# Patient Record
Sex: FEMALE | Race: WHITE
[De-identification: names, ages, dates, MRNs, and addresses within clinical notes are randomized per-mention and may not be internally consistent; named-entity substitution may affect disease eponyms.]

---

## 2017-05-07 ENCOUNTER — HOSPITAL ENCOUNTER (EMERGENCY)
Dept: HOSPITAL 76 - ED | Age: 82
Discharge: HOME | End: 2017-05-07
Payer: MEDICARE

## 2017-05-07 ENCOUNTER — HOSPITAL ENCOUNTER (OUTPATIENT)
Dept: HOSPITAL 76 - EMS | Age: 82
Discharge: ANOTHER HOSPITAL | End: 2017-05-07
Attending: SURGERY
Payer: MEDICARE

## 2017-05-07 VITALS — SYSTOLIC BLOOD PRESSURE: 166 MMHG | DIASTOLIC BLOOD PRESSURE: 82 MMHG

## 2017-05-07 DIAGNOSIS — Z66: ICD-10-CM

## 2017-05-07 DIAGNOSIS — R07.9: Primary | ICD-10-CM

## 2017-05-07 DIAGNOSIS — Z85.3: ICD-10-CM

## 2017-05-07 DIAGNOSIS — I10: ICD-10-CM

## 2017-05-07 DIAGNOSIS — R07.89: Primary | ICD-10-CM

## 2017-05-07 LAB
ALBUMIN/GLOB SERPL: 1.5 {RATIO} (ref 1–2.2)
ANION GAP SERPL CALCULATED.4IONS-SCNC: 10 MMOL/L (ref 6–13)
BASOPHILS NFR BLD AUTO: 0 10^3/UL (ref 0–0.1)
BASOPHILS NFR BLD AUTO: 0.4 %
BILIRUB BLD-MCNC: 0.6 MG/DL (ref 0.2–1)
BUN SERPL-MCNC: 19 MG/DL (ref 6–20)
CALCIUM UR-MCNC: 8.9 MG/DL (ref 8.5–10.3)
CHLORIDE SERPL-SCNC: 102 MMOL/L (ref 101–111)
CO2 SERPL-SCNC: 25 MMOL/L (ref 21–32)
CREAT SERPLBLD-SCNC: 0.8 MG/DL (ref 0.4–1)
EOSINOPHIL # BLD AUTO: 0.2 10^3/UL (ref 0–0.7)
EOSINOPHIL NFR BLD AUTO: 2.1 %
ERYTHROCYTE [DISTWIDTH] IN BLOOD BY AUTOMATED COUNT: 14.7 % (ref 12–15)
GFRSERPLBLD MDRD-ARVRAT: 69 ML/MIN/{1.73_M2} (ref 89–?)
GLOBULIN SER-MCNC: 2.6 G/DL (ref 2.1–4.2)
GLUCOSE SERPL-MCNC: 99 MG/DL (ref 70–100)
HCT VFR BLD AUTO: 37 % (ref 37–47)
HGB UR QL STRIP: 12.3 G/DL (ref 12–16)
LIPASE SERPL-CCNC: 23 U/L (ref 22–51)
LYMPHOCYTES # SPEC AUTO: 1.6 10^3/UL (ref 1.5–3.5)
LYMPHOCYTES NFR BLD AUTO: 17.7 %
MCH RBC QN AUTO: 31.1 PG (ref 27–31)
MCHC RBC AUTO-ENTMCNC: 33.3 G/DL (ref 32–36)
MCV RBC AUTO: 93.2 FL (ref 81–99)
MONOCYTES # BLD AUTO: 1 10^3/UL (ref 0–1)
MONOCYTES NFR BLD AUTO: 10.7 %
NEUTROPHILS # BLD AUTO: 6.4 10^3/UL (ref 1.5–6.6)
NEUTROPHILS # SNV AUTO: 9.3 X10^3/UL (ref 4.8–10.8)
NEUTROPHILS NFR BLD AUTO: 69.1 %
NRBC # BLD AUTO: 0.1 /100WBC
PDW BLD AUTO: 10.9 FL (ref 7.9–10.8)
POTASSIUM SERPL-SCNC: 3.6 MMOL/L (ref 3.5–5)
PROT SPEC-MCNC: 6.4 G/DL (ref 6.7–8.2)
RBC MAR: 3.97 10^6/UL (ref 4.2–5.4)
SODIUM SERPLBLD-SCNC: 137 MMOL/L (ref 135–145)
WBC # BLD: 9.3 X10^3/UL

## 2017-05-07 PROCEDURE — 84484 ASSAY OF TROPONIN QUANT: CPT

## 2017-05-07 PROCEDURE — 99284 EMERGENCY DEPT VISIT MOD MDM: CPT

## 2017-05-07 PROCEDURE — 71020: CPT

## 2017-05-07 PROCEDURE — 93010 ELECTROCARDIOGRAM REPORT: CPT

## 2017-05-07 PROCEDURE — 36415 COLL VENOUS BLD VENIPUNCTURE: CPT

## 2017-05-07 PROCEDURE — 93005 ELECTROCARDIOGRAM TRACING: CPT

## 2017-05-07 PROCEDURE — 83690 ASSAY OF LIPASE: CPT

## 2017-05-07 PROCEDURE — 80053 COMPREHEN METABOLIC PANEL: CPT

## 2017-05-07 PROCEDURE — 99283 EMERGENCY DEPT VISIT LOW MDM: CPT

## 2017-05-07 PROCEDURE — 85025 COMPLETE CBC W/AUTO DIFF WBC: CPT

## 2017-05-07 NOTE — ED PHYSICIAN DOCUMENTATION
PD HPI CHEST PAIN





- Stated complaint


Stated Complaint: CP





- Chief complaint


Chief Complaint: Cardiac





- History obtained from


History obtained from: Patient





- History of Present Illness


Timing - onset: Enter  time (22:45)


Timing - onset during: Rest


Timing - duration: Minutes (15-20)


Timing - details: Abrupt onset, Now resolved, Constant


Pain level max: 5


Pain level now: 0


Quality: Pain


Location: Substernal


Radiation: Other (no radiation)


Improved by: Nothing


Worsened by: Other (no exacerbating factors)


Associated symptoms: No: Shortness of air, Diaphoresis, Nausea, Vomiting, 

Feeling faint / dizzy, General Weakness, Palpitations, Cough


Similar symptoms before: Has not had sx before


Recently seen: Not recently seen





- Additional information


Additional information: 





patient complains of sudden onset of chest pain 10:45 PM tonight while reading 

in bed. pain has resolved, lasted approximately 15 to 20 minutes. No 

exacerbating or ameliorating factors. Patient denies history of similar 

symptoms.





Review of Systems


Constitutional: reports: Reviewed and negative


Cardiac: reports: Chest pain / pressure.  denies: Palpitations, Pedal edema, 

Calf pain


Respiratory: denies: Dyspnea


GI: reports: Reviewed and negative





PD PAST MEDICAL HISTORY





- Past Medical History


Cardiovascular: None


Respiratory: None


Neuro: None


Endocrine/Autoimmune: None


GI: None


GYN: Breast cancer


: None


HEENT: None


Psych: None


Musculoskeletal: None


Derm: Rosacea





- Past Surgical History


Past Surgical History: Yes





- Present Medications


Home Medications: 


 Ambulatory Orders











 Medication  Instructions  Recorded  Confirmed


 


Cephalexin 500 mg PO TID #15 capsule 04/29/16 05/07/17


 


Lisinopril 10 mg PO DAILY #20 tablet 05/07/17 














- Allergies


Allergies/Adverse Reactions: 


 Allergies











Allergy/AdvReac Type Severity Reaction Status Date / Time


 


Penicillins Allergy  Hives Verified 05/07/17 00:37














- Social History


Does the pt smoke?: No


Smoking Status: Never smoker


Does the pt drink ETOH?: Yes


Does the pt have substance abuse?: No





- Immunizations


Immunizations are current?: Yes





- POLST


Patient has POLST: Yes


POLST Status: DNR





PD ED PE NORMAL





- Vitals


Vital signs reviewed: Yes





- General


General: Alert and oriented X 3, No acute distress, Well developed/nourished





- Cardiac


Cardiac: RRR, No murmur





- Respiratory


Respiratory: No respiratory distress, Clear bilaterally





- Abdomen


Abdomen: Soft, Non tender





- Derm


Derm: Normal color, Warm and dry





- Extremities


Extremities: No edema





Results





- Vitals


Vitals: 


 Vital Signs - 24 hr











  05/07/17 05/07/17 05/07/17





  00:34 01:54 03:24


 


Temperature 36.2 C L 36.9 C 


 


Heart Rate 75 75 78


 


Respiratory 18 16 16





Rate   


 


Blood Pressure 198/87 H 184/85 H 166/82 H


 


O2 Saturation 95 96 99








 Oxygen











O2 Source                      Room air

















- EKG (time done)


  ** No standard instances


Rate: Rate (enter#) (72)


Rhythm: NSR, LAE


Axis: LAD


Intervals: Normal UT


QRS: Normal


Ischemia: Normal ST segments





- Labs


Labs: 


 Laboratory Tests











  05/07/17 05/07/17 05/07/17





  00:50 00:50 00:50


 


WBC  9.3  


 


RBC  3.97 L  


 


Hgb  12.3  


 


Hct  37.0  


 


MCV  93.2  


 


MCH  31.1 H  


 


MCHC  33.3  


 


RDW  14.7  


 


Plt Count  121 L  


 


MPV  10.9 H  


 


Neut #  6.4  


 


Lymph #  1.6  


 


Mono #  1.0  


 


Eos #  0.2  


 


Baso #  0.0  


 


Absolute Nucleated RBC  0.01  


 


Nucleated RBCs  0.1  


 


Sodium   137 


 


Potassium   3.6 


 


Chloride   102 


 


Carbon Dioxide   25 


 


Anion Gap   10.0 


 


BUN   19 


 


Creatinine   0.8 


 


Estimated GFR (MDRD)   69 L 


 


Glucose   99 


 


Calcium   8.9 


 


Total Bilirubin   0.6 


 


AST   27 


 


ALT   21 


 


Alkaline Phosphatase   38 L 


 


Troponin I    < 0.04


 


Total Protein   6.4 L 


 


Albumin   3.8 


 


Globulin   2.6 


 


Albumin/Globulin Ratio   1.5 


 


Lipase   23 














- Rads (name of study)


  ** chest xray


Radiology: Prelim report reviewed, See rad report





PD MEDICAL DECISION MAKING





- ED course


Complexity details: reviewed results, re-evaluated patient, considered 

differential, d/w patient





Departure





- Departure


Disposition: 01 Home, Self Care


Clinical Impression: 


 Chest pain, Hypertension


Condition: Good


Instructions:  ED Chest Pain Atypical Unkn Cause, ED Hypertension New Begin Tx


Prescriptions: 


Lisinopril 10 mg PO DAILY #20 tablet


Discharge Date/Time: 05/07/17 03:22

## 2017-05-07 NOTE — XRAY REPORT
EXAM:

CHEST RADIOGRAPHY

 

EXAM DATE: 5/7/2017 01:53 AM.

 

CLINICAL HISTORY: Chest pain.

 

COMPARISON: 06/07/2003.

 

TECHNIQUE: 2 views.

 

FINDINGS: 

Lungs/Pleura: No focal opacities evident. No pleural effusion. No pneumothorax. Normal volumes.

 

Mediastinum: Heart and mediastinal contours are unremarkable.

 

Other: Small calcifications within the left breast.

 

IMPRESSION: Stable appearance of the chest without acute cardiopulmonary abnormality. 

 

[facility]

Referring Provider Line: [phone]

 

SITE ID: [id number]

## 2017-05-07 NOTE — XRAY PRELIMINARY REPORT
Accession: [id number]

Exam: XR Chest 2 View PA/LAT

 

IMPRESSION: Stable appearance of the chest without acute cardiopulmonary abnormality. 

 

[facility]

 

SITE ID: [id number]

## 2017-07-21 ENCOUNTER — HOSPITAL ENCOUNTER (OUTPATIENT)
Dept: HOSPITAL 76 - DI | Age: 82
Discharge: HOME | End: 2017-07-21
Attending: NURSE PRACTITIONER
Payer: MEDICARE

## 2017-07-21 DIAGNOSIS — N39.0: Primary | ICD-10-CM

## 2017-07-21 PROCEDURE — 76770 US EXAM ABDO BACK WALL COMP: CPT

## 2017-07-23 NOTE — ULTRASOUND REPORT
EXAM:

RENAL ULTRASOUND

 

EXAM DATE: 7/21/2017 05:34 PM.

 

CLINICAL HISTORY: Chronic UTI.

 

COMPARISON: None.

 

TECHNIQUE: Real-time scanning was performed with static images obtained. 

 

FINDINGS:

Right Kidney: 10 x 3.7 x 5 cm. Normal echotexture with no stones, contour-deforming masses, or hydron
ephrosis.

 

Left Kidney: 10.2 x 4.4 x 4.4 cm. Normal echotexture with no stones, contour-deforming masses, or hyd
ronephrosis. 

 

Bladder: Bilateral jets seen. The prevoid bladder volume was 182 cc. The postvoid bladder volume was 
4.8 cc. 

 

IMPRESSION: 

1. No renal mass, stones or hydronephrosis. 

2. Normal bladder.

 

[facility]

Referring Provider Line: [phone]

 

SITE ID: [id number]

## 2018-07-31 ENCOUNTER — HOSPITAL ENCOUNTER (OUTPATIENT)
Dept: HOSPITAL 76 - DI | Age: 83
Discharge: HOME | End: 2018-07-31
Attending: PHYSICIAN ASSISTANT
Payer: MEDICARE

## 2018-07-31 DIAGNOSIS — I07.1: ICD-10-CM

## 2018-07-31 DIAGNOSIS — H34.9: ICD-10-CM

## 2018-07-31 DIAGNOSIS — R09.89: ICD-10-CM

## 2018-07-31 DIAGNOSIS — R06.02: Primary | ICD-10-CM

## 2018-07-31 PROCEDURE — 93306 TTE W/DOPPLER COMPLETE: CPT

## 2018-07-31 PROCEDURE — 93880 EXTRACRANIAL BILAT STUDY: CPT

## 2018-07-31 NOTE — ULTRASOUND REPORT
Procedure Date:  07/31/2018   

Accession Number:  [id number] / [id number]                    

Procedure:  US  - Carotid Doppler Complete CPT Code:  

 

FULL RESULT:

 

 

EXAM:

BILATERAL CAROTID AND VERTEBRAL ARTERY DUPLEX DOPPLER ULTRASOUND:

 

EXAM DATE: 7/31/2018 04:00 PM

 

CLINICAL HISTORY: Left bruit, retinal artery occlusion. Shortness of 

breath.

 

COMPARISON: None.

 

TECHNIQUE: Grayscale imaging, color Doppler, and duplex spectral Doppler 

were used to evaluate the carotid and vertebral arteries bilaterally. 

Static images were obtained.

 

FINDINGS:  Minimal focal calcific plaque near the carotid bulbs and 

involving the proximal external carotid arteries bilaterally without 

evidence of significant stenosis by gray scale or color Doppler (less 

than 25% stenosis estimated.) External carotid arteries are patent. 

Normal antegrade flow is present in bilateral vertebral arteries.

 

VELOCITIES (cm/sec):

 

Right:

RCCA Prox: PSV 62 cm/sec.

RCCA Dist: PSV 64 cm/sec, EDV 16 cm/sec.

RECA: PSV 95 cm/sec.

R Bulb: PSV 69 cm/sec, EDV 19 cm/sec, ICA/CCA ratio 1.07.

CATIE Prox: PSV 76 cm/sec, EDV 20 cm/sec, ICA/CCA ratio 1.18.

CATIE Mid: PSV 74 cm/sec, EDV 24 cm/sec, ICA/CCA ratio 1.15.

CATIE Dist: PSV 66 cm/sec, EDV 19 cm/sec, ICA/CCA ratio 1.03.

RVA: PSV 55 cm/sec. RVA flow direction: Antegrade.

 

Left:

LCCA Prox: PSV 75 cm/sec.

LCCA Dist: PSV 79 cm/sec, EDV 19 cm/sec.

LECA: PSV 80 cm/sec.

L Bulb: PSV 57 cm/sec, EDV 17 cm/sec, ICA/CCA ratio 0.72.

LICA Prox: PSV 62 cm/sec, EDV 26 cm/sec, ICA/CCA ratio 0.78.

LICA Mid: PSV 46 cm/sec, EDV 18 cm/sec, ICA/CCA ratio 0.58.

LICA Dist: PSV 74 cm/sec, EDV 29 cm/sec, ICA/CCA ratio 0.93.

LVA: PSV 33 cm/sec. LVA flow direction:  Antegrade.

 

 

ICA diameter stenosis:

Right: <50% by velocity and <70% by NASCET criteria.

Left: <50% by velocity and <70% by NASCET criteria.

IMPRESSION:

1. Minimal bilateral carotid artery plaquing near the carotid bulbs and 

involving the proximal external carotid arteries.

2. In the right carotid artery there are no elevated carotid artery 

velocities to suggest hemodynamically significant stenosis.

3. In the left carotid artery there are no elevated carotid artery 

velocities to suggest hemodynamically significant stenosis.

4. Normal antegrade flow is present in bilateral vertebral arteries.

 

General Recommendations:

 

Stenosis =50% ICA - Follow-up ultrasound 6-12 months

Stenosis <50% ICA - High Risk Patient with plaque - Follow-up ultrasound 

1-2 years

Normal Study but High Risk Patient - Follow-up ultrasound 3-5 years

 

Management recommendations and diagnostic criteria are based on current 

IAC endorsed standards in Carotid Artery Stenosis: Grayscale and Doppler 

Ultrasound Diagnosis.

Validated velocity measurements with angiographic measurements and 

velocity criteria are extrapolated from diameter data as defined by the 

Society of Radiologists in Ultrasound Consensus Conference Radiology 

2003; 229;340-346.

 

[facility]

## 2020-01-07 ENCOUNTER — HOSPITAL ENCOUNTER (OUTPATIENT)
Dept: HOSPITAL 76 - EMS | Age: 85
End: 2020-01-07
Attending: SURGERY
Payer: MEDICARE

## 2020-01-07 DIAGNOSIS — R07.9: Primary | ICD-10-CM

## 2021-12-01 ENCOUNTER — HOSPITAL ENCOUNTER (OUTPATIENT)
Dept: HOSPITAL 76 - DI.S | Age: 86
Discharge: HOME | End: 2021-12-01
Attending: NURSE PRACTITIONER
Payer: MEDICARE

## 2021-12-01 DIAGNOSIS — M43.16: ICD-10-CM

## 2021-12-01 DIAGNOSIS — M51.36: ICD-10-CM

## 2021-12-01 DIAGNOSIS — M47.816: ICD-10-CM

## 2021-12-01 DIAGNOSIS — M16.0: Primary | ICD-10-CM

## 2021-12-01 NOTE — XRAY REPORT
PROCEDURE:  Hips 2V BILAT

 

INDICATIONS:  BILATERAL SCIATICA

 

TECHNIQUE:  AP and frog-leg lateral views of the bilateral hip were acquired.  

 

COMPARISON:  None

 

FINDINGS:  

 

Bones:  No acute fractures or dislocations.  No suspicious bony lesions.  The visualized pelvic ring 
appears intact.  Moderate-severe degenerative changes of the bilateral hips. Lower lumbar spondylosis
.

 

Soft tissues:  No suspicious soft tissue calcifications or masses.  Multiple pelvic calcifications li
gallo representing phleboliths.

 

IMPRESSION:  

Bilateral hip without acute fracture or malalignment. Moderate-severe bilateral hip osteoarthrosis.

 

Reviewed by: Cipriano Jones MD on 12/1/2021 4:56 PM PST

Approved by: Cipriano Jones MD on 12/1/2021 4:56 PM PST

 

 

Station ID:  [id number]

## 2021-12-01 NOTE — XRAY REPORT
PROCEDURE:  Lumbar Spine 2 View

 

INDICATIONS:  BILATERAL SCIATICA

 

TECHNIQUE:  3 views of the lumbar spine were acquired.  

 

COMPARISON:  None.

 

FINDINGS:  

 

Bones:  5 non-rib-bearing vertebrae are present.  There is mild, grade 1 anterolisthesis of L3 on L4 
and L4 on L5. Mild grade 1 retrolisthesis of L2 on L3. Multilevel disc space narrowing and endplate o
steophyte formation. Facet hypertrophy throughout the mid and lower lumbar spine. No vertebral body c
ompression fractures.  No suspicious bony lesions.  

 

Soft tissues:  Overlying bowel gas pattern is normal.  No suspicious soft tissue calcifications.  

 

IMPRESSION:  Multilevel degenerative disc and facet disease. No acute fracture. No osseous lesion. If
 symptoms and/or clinical suspicion for pathology continue, further assessment with repeat plain film
s, or advanced imaging (e.g., CT, MRI, or bone scan) is recommended for further assessment.

 

Reviewed by: Nuvia Leonard MD on 12/1/2021 2:25 PM PST

Approved by: Nuvia Leonard MD on 12/1/2021 2:25 PM PST

 

 

Station ID:  [id number]

## 2022-01-10 ENCOUNTER — HOSPITAL ENCOUNTER (OUTPATIENT)
Dept: HOSPITAL 76 - DI.S | Age: 87
Discharge: HOME | End: 2022-01-10
Attending: NURSE PRACTITIONER
Payer: MEDICARE

## 2022-01-10 DIAGNOSIS — M17.11: Primary | ICD-10-CM

## 2022-01-10 NOTE — XRAY REPORT
PROCEDURE:  Knee 3 View RT

 

INDICATIONS:  PAIN OF RIGHT KNEE JOINT

 

TECHNIQUE:  3 views of the right knee(s) were acquired.  

 

COMPARISON:  None.

 

FINDINGS:  

 

Bones:  No fractures or dislocations.  No suspicious bony lesions.  Moderate medial and patellofemora
l compartment osteoarthritis with marginal osteophytosis and joint space narrowing. Mild lateral comp
artment osteoarthritis.

 

Soft tissues:  No joint effusion.  No suspicious soft tissue calcifications.  

 

 

IMPRESSION:  

 

Tricompartmental osteoarthritis as described above.

 

Reviewed by: Faviola Mensah MD, PhD on 1/10/2022 5:06 PM PST

Approved by: Faviola Mensah MD, PhD on 1/10/2022 5:06 PM PST

 

 

Station ID:  [id number]

## 2022-10-10 ENCOUNTER — HOSPITAL ENCOUNTER (OUTPATIENT)
Dept: HOSPITAL 76 - DI.S | Age: 87
Discharge: HOME | End: 2022-10-10
Attending: NURSE PRACTITIONER
Payer: MEDICARE

## 2022-10-10 DIAGNOSIS — M47.816: Primary | ICD-10-CM

## 2022-10-10 DIAGNOSIS — M25.551: ICD-10-CM

## 2022-10-10 DIAGNOSIS — M25.552: ICD-10-CM

## 2022-10-10 NOTE — XRAY REPORT
PROCEDURE:  Hips 2V BILAT

 

INDICATIONS:  BILATERAL HIP JOINT PAIN

 

TECHNIQUE:  3 views of the hip were acquired.  

 

COMPARISON:  12/1/2021

 

FINDINGS:  

Bones: Moderate bilateral hip arthrosis, similar to 2021 radiographs. Partially visualized sacroiliac
 and lumbosacral degenerative changes.

 

There might be an impacted fracture at the subcapital region of the left femoral neck.

 

Soft tissues: Pelvic phleboliths. Vascular calcifications.

 

IMPRESSION:

 Suspected impacted fracture of the left subcapital region of the femoral neck. Consider CT to [facility] aung.

 

Reviewed by: Gurjit Hollins MD on 10/10/2022 1:44 PM PDT

Approved by: Gurjit Hollins MD on 10/10/2022 1:44 PM PDT

 

 

Station ID:  [id number]

## 2022-10-10 NOTE — XRAY REPORT
PROCEDURE:  Lumbar Spine 2 View

 

INDICATIONS:  CHRONIC LOW BACK PAIN

 

TECHNIQUE:  3 views of the lumbar spine were acquired.  

 

COMPARISON:  12/1/2021

 

FINDINGS:  

 

Bones: Increased dextroconvex curvature, which may be partially positional. There are 5 lumbar-type v
ertebral bodies. Again seen is 7 to 8 mm of L4 on L5 anterolisthesis.

Mild to moderate overall spondylosis, particularly with facet arthropathy in the lower lumbar spine. 
No acute changes compared to prior

 

Soft tissues: Vascular calcifications. Large colonic gas and stool burden.

 

IMPRESSION:

Spondylosis without acute radiographic abnormality. If there is high concern for further derangement,
 consider MRI evaluation. . 

 

Reviewed by: Gurjit Hollins MD on 10/10/2022 1:45 PM PDT

Approved by: Gurjit Hollins MD on 10/10/2022 1:45 PM PDT

 

 

Station ID:  [id number]